# Patient Record
Sex: MALE | Race: OTHER | NOT HISPANIC OR LATINO | URBAN - METROPOLITAN AREA
[De-identification: names, ages, dates, MRNs, and addresses within clinical notes are randomized per-mention and may not be internally consistent; named-entity substitution may affect disease eponyms.]

---

## 2017-05-21 ENCOUNTER — EMERGENCY (EMERGENCY)
Facility: HOSPITAL | Age: 42
LOS: 1 days | Discharge: HOME | End: 2017-05-21
Attending: EMERGENCY MEDICINE | Admitting: EMERGENCY MEDICINE
Payer: COMMERCIAL

## 2017-05-21 ENCOUNTER — RESULT REVIEW (OUTPATIENT)
Age: 42
End: 2017-05-21

## 2017-05-21 VITALS
DIASTOLIC BLOOD PRESSURE: 66 MMHG | HEART RATE: 72 BPM | TEMPERATURE: 98 F | SYSTOLIC BLOOD PRESSURE: 109 MMHG | RESPIRATION RATE: 18 BRPM | OXYGEN SATURATION: 97 %

## 2017-05-21 DIAGNOSIS — R21 RASH AND OTHER NONSPECIFIC SKIN ERUPTION: ICD-10-CM

## 2017-05-21 DIAGNOSIS — Z79.899 OTHER LONG TERM (CURRENT) DRUG THERAPY: ICD-10-CM

## 2017-05-21 DIAGNOSIS — L23.9 ALLERGIC CONTACT DERMATITIS, UNSPECIFIED CAUSE: ICD-10-CM

## 2017-05-21 PROCEDURE — 99283 EMERGENCY DEPT VISIT LOW MDM: CPT | Mod: 25

## 2017-05-21 PROCEDURE — 99282 EMERGENCY DEPT VISIT SF MDM: CPT

## 2017-05-21 PROCEDURE — 99283 EMERGENCY DEPT VISIT LOW MDM: CPT

## 2017-05-21 PROCEDURE — 11100 BX SKIN SUBCUTANEOUS&/MUCOUS MEMBRANE 1 LESION: CPT | Mod: 59

## 2017-05-21 PROCEDURE — 88305 TISSUE EXAM BY PATHOLOGIST: CPT | Mod: 26

## 2017-05-21 PROCEDURE — 88305 TISSUE EXAM BY PATHOLOGIST: CPT

## 2017-05-21 RX ORDER — DIPHENHYDRAMINE HCL 50 MG
50 CAPSULE ORAL ONCE
Qty: 0 | Refills: 0 | Status: COMPLETED | OUTPATIENT
Start: 2017-05-21 | End: 2017-05-21

## 2017-05-21 NOTE — ED ADULT NURSE NOTE - NS ED NURSE DC INFO COMPLEXITY
Complex: Multiple Rx/Tx. Pt has difficulty understanding. Requires additional help/Simple: Patient demonstrates quick and easy understanding/Verbalized Understanding

## 2017-05-21 NOTE — ED PROVIDER NOTE - OBJECTIVE STATEMENT
41 M no significant PMHx p/w rash on face and neck for x 2days. First noted chalo 2 days ago starting as a bump on the L side of face when he was shaving. Rash progressively got worse and then started itching, no pain.  rash started on the Went to urgent care yesterday, was given steroid but rash hasn't improved. Denies any new shaving cream, soaps, detergent, food, medications. 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 No fever, chills, n/v/d, constipation. Eyes itchy, and 'feels swollen". No visual changes, lacrimation, runny nose, sneezing. +itch throat, denies soreness or difficulty swallowing 41 M no significant PMHx p/w rash on face and neck for x 2days. First noted chalo 2 days ago starting as a bump on the L side of face when he was shaving. Rash progressively got worse and then started itching, no pain.  rash started on the face and progressed to the neck and this AM was all swollen. Went to urgent care yesterday, was given steroid but rash hasn't improved. Denies any new shaving cream, soaps, detergent, food, medications. 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 No fever, chills, n/v/d, constipation. Eyes itchy, and 'feels swollen". No visual changes, lacrimation, runny nose, sneezing. +itch throat, denies soreness or difficulty swallowing.

## 2017-05-21 NOTE — ED PROVIDER NOTE - ATTENDING CONTRIBUTION TO CARE
Private Physician Martinez Francisco  41y male pmh neg, No habits, Employed as , Only allergy seasonal. Pt comes to ed complains of puritic rash face and neck past 3 days. No new meds, soaps.perfumes,detergents,foods,pets,plants, insecticides. No contacts with similar illness. Seen by urgent care with steroids yesterday improved and then worsened yesterday evening, PE WDWN nad normocephalic atraumatic chest clear anterior & posterior abd soft +bs neuro no focal defects. Face raised red papular rash to face and neck,. some with slight crusting.  Harrison Jones MD, Facep

## 2017-05-21 NOTE — ED ADULT NURSE NOTE - OBJECTIVE STATEMENT
40 y/o male presents to the ED c/o redness and swelling to face x 2 days. Pt states he noticed a small bite on forehead after working outside, increasingly worsened with multiple areas of erythema and generalized swelling. Seen by urgent care, given steroids and benadryl- states no relief. Denies SOB, difficulty breathing, n/v. Denies any new medications, food, lungs, allergies. Lungs CTA b/l, no swelling in throat, saturating at 100% on RA. Pt A&Ox3, in no respiratory distress, no CP, resting comfortably with safety maintained. 42 y/o male presents to the ED c/o redness and swelling to face x 2 days. Pt states he noticed a small bite on forehead after working outside, increasingly worsened with multiple areas of erythema and generalized swelling, slight pruituis. Seen by urgent care, given steroids and benadryl- states no relief. Denies SOB, difficulty breathing, n/v. Denies any new medications, food, lungs, allergies. Lungs CTA b/l, no swelling in throat, saturating at 100% on RA. Pt A&Ox3, in no respiratory distress, no CP, resting comfortably with safety maintained.

## 2017-05-21 NOTE — ED PROVIDER NOTE - PROGRESS NOTE DETAILS
Derm c/s Discussed with Derm req dc on Prednisone 40 daily  and will follow up as opt.  Harrison Jones MD, Facep

## 2017-05-21 NOTE — ED ADULT NURSE NOTE - CHPI ED SYMPTOMS NEG
no cough/no nausea/no vomiting/no shortness of breath/no wheezing/no difficulty swallowing/no difficulty breathing/no throat itching

## 2017-05-21 NOTE — ED PROVIDER NOTE - CARE PLAN
Principal Discharge DX:	Contact allergic reaction  Instructions for follow-up, activity and diet:	Follow up with Dr Palomo

## 2017-05-22 PROBLEM — Z00.00 ENCOUNTER FOR PREVENTIVE HEALTH EXAMINATION: Status: ACTIVE | Noted: 2017-05-22

## 2017-05-22 RX ORDER — CEPHALEXIN 500 MG
1 CAPSULE ORAL
Qty: 0 | Refills: 0 | COMMUNITY
Start: 2017-05-22

## 2017-05-22 RX ORDER — VALACYCLOVIR 500 MG/1
1 TABLET, FILM COATED ORAL
Qty: 0 | Refills: 0 | COMMUNITY
Start: 2017-05-22

## 2017-05-23 ENCOUNTER — INPATIENT (INPATIENT)
Facility: HOSPITAL | Age: 42
LOS: 4 days | Discharge: HOME | DRG: 607 | End: 2017-05-28
Attending: INTERNAL MEDICINE | Admitting: INTERNAL MEDICINE
Payer: COMMERCIAL

## 2017-05-23 VITALS
OXYGEN SATURATION: 99 % | SYSTOLIC BLOOD PRESSURE: 108 MMHG | RESPIRATION RATE: 18 BRPM | HEART RATE: 88 BPM | TEMPERATURE: 98 F | DIASTOLIC BLOOD PRESSURE: 70 MMHG

## 2017-05-23 DIAGNOSIS — T78.40XA ALLERGY, UNSPECIFIED, INITIAL ENCOUNTER: ICD-10-CM

## 2017-05-23 LAB
ALBUMIN SERPL ELPH-MCNC: 4.2 G/DL — SIGNIFICANT CHANGE UP (ref 3.3–5)
ALP SERPL-CCNC: 123 U/L — HIGH (ref 40–120)
ALT FLD-CCNC: 24 U/L RC — SIGNIFICANT CHANGE UP (ref 10–45)
ANION GAP SERPL CALC-SCNC: 13 MMOL/L — SIGNIFICANT CHANGE UP (ref 5–17)
APTT BLD: 32.1 SEC — SIGNIFICANT CHANGE UP (ref 27.5–37.4)
AST SERPL-CCNC: 13 U/L — SIGNIFICANT CHANGE UP (ref 10–40)
BASOPHILS # BLD AUTO: 0 K/UL — SIGNIFICANT CHANGE UP (ref 0–0.2)
BASOPHILS NFR BLD AUTO: 0.3 % — SIGNIFICANT CHANGE UP (ref 0–2)
BILIRUB SERPL-MCNC: 1.1 MG/DL — SIGNIFICANT CHANGE UP (ref 0.2–1.2)
BUN SERPL-MCNC: 13 MG/DL — SIGNIFICANT CHANGE UP (ref 7–23)
CALCIUM SERPL-MCNC: 8.9 MG/DL — SIGNIFICANT CHANGE UP (ref 8.4–10.5)
CHLORIDE SERPL-SCNC: 100 MMOL/L — SIGNIFICANT CHANGE UP (ref 96–108)
CO2 SERPL-SCNC: 26 MMOL/L — SIGNIFICANT CHANGE UP (ref 22–31)
CREAT SERPL-MCNC: 0.87 MG/DL — SIGNIFICANT CHANGE UP (ref 0.5–1.3)
EOSINOPHIL # BLD AUTO: 0.4 K/UL — SIGNIFICANT CHANGE UP (ref 0–0.5)
EOSINOPHIL NFR BLD AUTO: 3.1 % — SIGNIFICANT CHANGE UP (ref 0–6)
GLUCOSE SERPL-MCNC: 93 MG/DL — SIGNIFICANT CHANGE UP (ref 70–99)
HCT VFR BLD CALC: 52.5 % — HIGH (ref 39–50)
HGB BLD-MCNC: 18.1 G/DL — HIGH (ref 13–17)
HSV+VZV DNA SPEC QL NAA+PROBE: SIGNIFICANT CHANGE UP
INR BLD: 1.09 RATIO — SIGNIFICANT CHANGE UP (ref 0.88–1.16)
LYMPHOCYTES # BLD AUTO: 0.9 K/UL — LOW (ref 1–3.3)
LYMPHOCYTES # BLD AUTO: 6.7 % — LOW (ref 13–44)
MCHC RBC-ENTMCNC: 33.4 PG — SIGNIFICANT CHANGE UP (ref 27–34)
MCHC RBC-ENTMCNC: 34.5 GM/DL — SIGNIFICANT CHANGE UP (ref 32–36)
MCV RBC AUTO: 97 FL — SIGNIFICANT CHANGE UP (ref 80–100)
MONOCYTES # BLD AUTO: 1.2 K/UL — HIGH (ref 0–0.9)
MONOCYTES NFR BLD AUTO: 9.2 % — SIGNIFICANT CHANGE UP (ref 2–14)
NEUTROPHILS # BLD AUTO: 10.5 K/UL — HIGH (ref 1.8–7.4)
NEUTROPHILS NFR BLD AUTO: 80.7 % — HIGH (ref 43–77)
PLATELET # BLD AUTO: 221 K/UL — SIGNIFICANT CHANGE UP (ref 150–400)
POTASSIUM SERPL-MCNC: 4 MMOL/L — SIGNIFICANT CHANGE UP (ref 3.5–5.3)
POTASSIUM SERPL-SCNC: 4 MMOL/L — SIGNIFICANT CHANGE UP (ref 3.5–5.3)
PROT SERPL-MCNC: 6.9 G/DL — SIGNIFICANT CHANGE UP (ref 6–8.3)
PROTHROM AB SERPL-ACNC: 11.8 SEC — SIGNIFICANT CHANGE UP (ref 9.8–12.7)
RBC # BLD: 5.41 M/UL — SIGNIFICANT CHANGE UP (ref 4.2–5.8)
RBC # FLD: 11.4 % — SIGNIFICANT CHANGE UP (ref 10.3–14.5)
SODIUM SERPL-SCNC: 139 MMOL/L — SIGNIFICANT CHANGE UP (ref 135–145)
SPECIMEN SOURCE: SIGNIFICANT CHANGE UP
WBC # BLD: 13 K/UL — HIGH (ref 3.8–10.5)
WBC # FLD AUTO: 13 K/UL — HIGH (ref 3.8–10.5)

## 2017-05-23 PROCEDURE — 99285 EMERGENCY DEPT VISIT HI MDM: CPT

## 2017-05-23 RX ORDER — SODIUM CHLORIDE 9 MG/ML
1000 INJECTION INTRAMUSCULAR; INTRAVENOUS; SUBCUTANEOUS
Qty: 0 | Refills: 0 | Status: DISCONTINUED | OUTPATIENT
Start: 2017-05-23 | End: 2017-05-26

## 2017-05-23 RX ORDER — ACETAMINOPHEN 500 MG
650 TABLET ORAL EVERY 6 HOURS
Qty: 0 | Refills: 0 | Status: DISCONTINUED | OUTPATIENT
Start: 2017-05-23 | End: 2017-05-28

## 2017-05-23 RX ORDER — HEPARIN SODIUM 5000 [USP'U]/ML
5000 INJECTION INTRAVENOUS; SUBCUTANEOUS EVERY 12 HOURS
Qty: 0 | Refills: 0 | Status: DISCONTINUED | OUTPATIENT
Start: 2017-05-23 | End: 2017-05-28

## 2017-05-23 RX ORDER — CEFAZOLIN SODIUM 1 G
2000 VIAL (EA) INJECTION EVERY 8 HOURS
Qty: 0 | Refills: 0 | Status: DISCONTINUED | OUTPATIENT
Start: 2017-05-23 | End: 2017-05-26

## 2017-05-23 RX ORDER — DEXAMETHASONE 0.5 MG/5ML
7 ELIXIR ORAL ONCE
Qty: 0 | Refills: 0 | Status: COMPLETED | OUTPATIENT
Start: 2017-05-23 | End: 2017-05-23

## 2017-05-23 RX ADMIN — Medication 101.4 MILLIGRAM(S): at 10:31

## 2017-05-23 RX ADMIN — Medication 100 MILLIGRAM(S): at 22:39

## 2017-05-23 RX ADMIN — Medication 1 APPLICATION(S): at 19:14

## 2017-05-23 RX ADMIN — SODIUM CHLORIDE 100 MILLILITER(S): 9 INJECTION INTRAMUSCULAR; INTRAVENOUS; SUBCUTANEOUS at 20:00

## 2017-05-23 RX ADMIN — Medication 100 MILLIGRAM(S): at 13:46

## 2017-05-23 RX ADMIN — HEPARIN SODIUM 5000 UNIT(S): 5000 INJECTION INTRAVENOUS; SUBCUTANEOUS at 19:13

## 2017-05-23 NOTE — H&P ADULT. - ASSESSMENT
pt is a 41 yr old w/ marked facial swelling and lesions likely imprtigo  iv abs as per id  r/o zoster  f/u cultures  iv fluids  dvt proph  discussed w/ pt / wife/ dr cheung

## 2017-05-23 NOTE — ED PROVIDER NOTE - ATTENDING CONTRIBUTION TO CARE
Patient seen and evaluated with resident/NP/PA, however HPI, ROS, PE and MDM as documented authored by myself - Wallace Leigh MD

## 2017-05-23 NOTE — ED PROVIDER NOTE - MEDICAL DECISION MAKING DETAILS
Patient with significant facial edema from unclear cause - worsening of symptoms possibly related to now off steroids x24 hours - received 3mg Decadron prior to arrival per family, will increase dose to 7mg, check basic labs, re-consult dermatology in ED. No evidence of airway compromise at this time, however given degree of facial swelling will require admission for IVF and airway monitoring

## 2017-05-23 NOTE — ED PROVIDER NOTE - PHYSICAL EXAMINATION
HEENT/Skin:  Significant generalized facial swelling, unable to open eyes due to degree of edema of eyelids, significant swelling of lips.  Intraoral exam limited however no evidence of tongue swelling/uvular edema, normal phonation.  Skin on face with multiple lesions in various stages - some with generalized breakdown, some erythematous, some possible honey crusting of lesions versus dried exudative fluid (unclear)

## 2017-05-23 NOTE — H&P ADULT. - HISTORY OF PRESENT ILLNESS
Patient presenting with facial swelling.  Startedabout  4 days ago, started on steroids - worsened.  Came to ED - seen by Derm Dr. Palomo - bedside biopsy (results unknown) and discharged with steroids  Never filled steroids, saw PMD yesterday, started on acyclovir/keflex - this AM awoke with significant facial swelling.  Reporting difficulty opening mouth due to swelling, unable to open eyes.  Denying swelling in mouth/throat,/ no prior allergic reactions.

## 2017-05-23 NOTE — ED PROVIDER NOTE - OBJECTIVE STATEMENT
Patient presenting with facial swelling.  Started 4 days ago, started on steroids - worsened.  Came to ED - seen by Derm Dr. Palomo - bedside biopsy (results unknown) and discharged with higher steroid burst.  Never filled steroids, saw PMD yesterday, started on acyclovir/keflex (yesterday) - this AM awoke with significant facial swelling.  Reporting difficulty opening mouth due to swelling, unable to open eyes.  Denying swelling in mouth/throat, can swallow secretions, but cannot eat today as having difficulty moving jaw due to facial swelling.  Initial trigger unknown, no prior allergic reactions.

## 2017-05-24 ENCOUNTER — TRANSCRIPTION ENCOUNTER (OUTPATIENT)
Age: 42
End: 2017-05-24

## 2017-05-24 LAB
HCT VFR BLD CALC: 48.5 % — SIGNIFICANT CHANGE UP (ref 39–50)
HGB BLD-MCNC: 16.5 G/DL — SIGNIFICANT CHANGE UP (ref 13–17)
MCHC RBC-ENTMCNC: 31.5 PG — SIGNIFICANT CHANGE UP (ref 27–34)
MCHC RBC-ENTMCNC: 34 GM/DL — SIGNIFICANT CHANGE UP (ref 32–36)
MCV RBC AUTO: 92.7 FL — SIGNIFICANT CHANGE UP (ref 80–100)
PLATELET # BLD AUTO: 232 K/UL — SIGNIFICANT CHANGE UP (ref 150–400)
RBC # BLD: 5.23 M/UL — SIGNIFICANT CHANGE UP (ref 4.2–5.8)
RBC # FLD: 12.6 % — SIGNIFICANT CHANGE UP (ref 10.3–14.5)
WBC # BLD: 19.64 K/UL — HIGH (ref 3.8–10.5)
WBC # FLD AUTO: 19.64 K/UL — HIGH (ref 3.8–10.5)

## 2017-05-24 PROCEDURE — 70450 CT HEAD/BRAIN W/O DYE: CPT | Mod: 26

## 2017-05-24 PROCEDURE — 99223 1ST HOSP IP/OBS HIGH 75: CPT | Mod: GC

## 2017-05-24 RX ORDER — SODIUM CHLORIDE 9 MG/ML
500 INJECTION INTRAMUSCULAR; INTRAVENOUS; SUBCUTANEOUS ONCE
Qty: 0 | Refills: 0 | Status: COMPLETED | OUTPATIENT
Start: 2017-05-24 | End: 2017-05-24

## 2017-05-24 RX ORDER — DEXAMETHASONE 0.5 MG/5ML
10 ELIXIR ORAL ONCE
Qty: 0 | Refills: 0 | Status: COMPLETED | OUTPATIENT
Start: 2017-05-24 | End: 2017-05-24

## 2017-05-24 RX ORDER — DIPHENHYDRAMINE HCL 50 MG
50 CAPSULE ORAL ONCE
Qty: 0 | Refills: 0 | Status: COMPLETED | OUTPATIENT
Start: 2017-05-24 | End: 2017-05-24

## 2017-05-24 RX ORDER — MUPIROCIN 20 MG/G
1 OINTMENT TOPICAL
Qty: 0 | Refills: 0 | Status: DISCONTINUED | OUTPATIENT
Start: 2017-05-24 | End: 2017-05-28

## 2017-05-24 RX ORDER — FAMOTIDINE 10 MG/ML
20 INJECTION INTRAVENOUS ONCE
Qty: 0 | Refills: 0 | Status: COMPLETED | OUTPATIENT
Start: 2017-05-24 | End: 2017-05-24

## 2017-05-24 RX ADMIN — HEPARIN SODIUM 5000 UNIT(S): 5000 INJECTION INTRAVENOUS; SUBCUTANEOUS at 05:06

## 2017-05-24 RX ADMIN — Medication 1 APPLICATION(S): at 05:06

## 2017-05-24 RX ADMIN — HEPARIN SODIUM 5000 UNIT(S): 5000 INJECTION INTRAVENOUS; SUBCUTANEOUS at 17:54

## 2017-05-24 RX ADMIN — FAMOTIDINE 20 MILLIGRAM(S): 10 INJECTION INTRAVENOUS at 00:53

## 2017-05-24 RX ADMIN — Medication 50 MILLIGRAM(S): at 18:49

## 2017-05-24 RX ADMIN — SODIUM CHLORIDE 100 MILLILITER(S): 9 INJECTION INTRAMUSCULAR; INTRAVENOUS; SUBCUTANEOUS at 14:26

## 2017-05-24 RX ADMIN — Medication 1 APPLICATION(S): at 17:53

## 2017-05-24 RX ADMIN — Medication 50 MILLIGRAM(S): at 00:53

## 2017-05-24 RX ADMIN — SODIUM CHLORIDE 500 MILLILITER(S): 9 INJECTION INTRAMUSCULAR; INTRAVENOUS; SUBCUTANEOUS at 00:53

## 2017-05-24 RX ADMIN — SODIUM CHLORIDE 100 MILLILITER(S): 9 INJECTION INTRAMUSCULAR; INTRAVENOUS; SUBCUTANEOUS at 21:48

## 2017-05-24 RX ADMIN — Medication 100 MILLIGRAM(S): at 21:48

## 2017-05-24 RX ADMIN — Medication 100 MILLIGRAM(S): at 14:27

## 2017-05-24 RX ADMIN — MUPIROCIN 1 APPLICATION(S): 20 OINTMENT TOPICAL at 21:48

## 2017-05-24 RX ADMIN — Medication 65 MILLIGRAM(S): at 17:54

## 2017-05-24 RX ADMIN — Medication 102 MILLIGRAM(S): at 01:13

## 2017-05-24 RX ADMIN — Medication 100 MILLIGRAM(S): at 05:06

## 2017-05-24 NOTE — DISCHARGE NOTE ADULT - MEDICATION SUMMARY - MEDICATIONS TO TAKE
I will START or STAY ON the medications listed below when I get home from the hospital:    predniSONE 10 mg oral tablet  -- 3 tab(s) by mouth once a day  -- Indication: For Facial swelling    diphenhydrAMINE 25 mg oral capsule  -- 1 cap(s) by mouth every 6 hours, As needed, Rash and/or Itching  -- Indication: For Rash/Itching    cetirizine 10 mg oral tablet  -- 1 tab(s) by mouth once a day  -- Indication: For Facial swelling    mupirocin 2% topical ointment  -- 1 application on skin 2 times a day  -- Indication: For Rash    clobetasol 0.05% topical ointment  -- 1 application on skin 2 times a day  -- Indication: For Rash    ocular lubricant ophthalmic solution  -- 1 drop(s) to each affected eye 4 times a day, As needed, Dry Eyes  -- Indication: For Dry eyes    amoxicillin-clavulanate 875 mg-125 mg oral tablet  -- 1 tab(s) by mouth 2 times a day  -- Indication: For Facial swelling

## 2017-05-24 NOTE — DISCHARGE NOTE ADULT - PATIENT PORTAL LINK FT
“You can access the Follow[facility] Patient Portal, offered by [facility], by registering with the following website: [email or web address]”

## 2017-05-24 NOTE — DISCHARGE NOTE ADULT - CARE PROVIDER_API CALL
Fernie Palomo), Dermatology  [address] [place] [address]  Phone: [phone]  Fax: [phone]    Vladimir Garrett (MD), Infectious Disease; Internal Medicine  [address] [place] [address]  Phone: [phone]  Fax: [phone] Fernie Palomo), Dermatology  [address] [place] [address]  Phone: [phone]  Fax: [phone]    Vladimir Garrett), Infectious Disease; Internal Medicine  [address] [place] [address]  Phone: [phone]  Fax: [phone]    Mari Mahoney), Pediatrics AllergyImmunology  [address] [place] [address]  Phone: [phone]  Fax: [phone]

## 2017-05-24 NOTE — DISCHARGE NOTE ADULT - CARE PROVIDERS DIRECT ADDRESSES
,[email or web address],DirectAddress_Unknown

## 2017-05-24 NOTE — DISCHARGE NOTE ADULT - PLAN OF CARE
likely impetigo Continue with medications as prescribed by your physician.  Follow-up with your primary care physician in 1 week. You will need to follow up with the dermatologist, Dr. Palomo [phone], on Tuesday, May 30 - you must call to make an appointment.  At this time, you will discuss the tapering of your prednisone dose.  Continue with medications as prescribed by your physician.  Follow-up with your primary care physician in 1 week.    If your rash/facial swelling does not continue to improve, you should follow up with the infectious disease doctor, Dr. Garrett [phone]. You will need to follow up with the dermatologist, Dr. Palomo [phone], on Tuesday, May 30 - you must call to make an appointment.  At this time, you will discuss the tapering of your prednisone dose.    You will need to follow up with the allergist/immunologist [phone] within one week of discharge - please call to make an appointment.  Continue with medications as prescribed by your physician.  Follow-up with your primary care physician in 1 week.    If your rash/facial swelling does not continue to improve, you should follow up with the infectious disease doctor, Dr. Garrett [phone]. You are being discharged on antibiotics (Augmentin) which you will take for a total of 5 days - the course will be completed after the am dose on June 2, 2017.    You will need to follow up with the dermatologist, Dr. Palomo [phone], on Tuesday, May 30 - you must call to make an appointment.  At this time, you will discuss the tapering of your prednisone dose.    You will need to follow up with the allergist/immunologist [phone] within one week of discharge - please call to make an appointment.  Continue with medications as prescribed by your physician.  Follow-up with your primary care physician in 1 week.    If your rash/facial swelling does not continue to improve, you should follow up with the infectious disease doctor, Dr. Garrett [phone].

## 2017-05-24 NOTE — DISCHARGE NOTE ADULT - CARE PLAN
Principal Discharge DX:	Facial swelling  Goal:	likely impetigo  Instructions for follow-up, activity and diet:	Continue with medications as prescribed by your physician.  Follow-up with your primary care physician in 1 week. Principal Discharge DX:	Facial swelling  Goal:	likely impetigo  Instructions for follow-up, activity and diet:	You will need to follow up with the dermatologist, Dr. Palomo [phone], on Tuesday, May 30 - you must call to make an appointment.  At this time, you will discuss the tapering of your prednisone dose.  Continue with medications as prescribed by your physician.  Follow-up with your primary care physician in 1 week.    If your rash/facial swelling does not continue to improve, you should follow up with the infectious disease doctor, Dr. Garrett [phone]. Principal Discharge DX:	Facial swelling  Goal:	likely impetigo  Instructions for follow-up, activity and diet:	You will need to follow up with the dermatologist, Dr. Palomo [phone], on Tuesday, May 30 - you must call to make an appointment.  At this time, you will discuss the tapering of your prednisone dose.    You will need to follow up with the allergist/immunologist [phone] within one week of discharge - please call to make an appointment.  Continue with medications as prescribed by your physician.  Follow-up with your primary care physician in 1 week.    If your rash/facial swelling does not continue to improve, you should follow up with the infectious disease doctor, Dr. Garrett [phone]. Principal Discharge DX:	Facial swelling  Goal:	likely impetigo  Instructions for follow-up, activity and diet:	You are being discharged on antibiotics (Augmentin) which you will take for a total of 5 days - the course will be completed after the am dose on June 2, 2017.    You will need to follow up with the dermatologist, Dr. Palomo [phone], on Tuesday, May 30 - you must call to make an appointment.  At this time, you will discuss the tapering of your prednisone dose.    You will need to follow up with the allergist/immunologist [phone] within one week of discharge - please call to make an appointment.  Continue with medications as prescribed by your physician.  Follow-up with your primary care physician in 1 week.    If your rash/facial swelling does not continue to improve, you should follow up with the infectious disease doctor, Dr. Garrett [phone]. Principal Discharge DX:	Facial swelling  Goal:	likely impetigo  Instructions for follow-up, activity and diet:	You are being discharged on antibiotics (Augmentin) which you will take for a total of 5 days - the course will be completed after the am dose on June 2, 2017.    You will need to follow up with the dermatologist, Dr. Palomo [phone], on Tuesday, May 30 - you must call to make an appointment.  At this time, you will discuss the tapering of your prednisone dose.    You will need to follow up with the allergist/immunologist [phone] within one week of discharge - please call to make an appointment.  Continue with medications as prescribed by your physician.  Follow-up with your primary care physician in 1 week.    If your rash/facial swelling does not continue to improve, you should follow up with the infectious disease doctor, Dr. Garrett [phone]. Principal Discharge DX:	Facial swelling  Goal:	likely impetigo  Instructions for follow-up, activity and diet:	You are being discharged on antibiotics (Augmentin) which you will take for a total of 5 days - the course will be completed after the am dose on June 2, 2017.    You will need to follow up with the dermatologist, Dr. Palomo [phone], on Tuesday, May 30 - you must call to make an appointment.  At this time, you will discuss the tapering of your prednisone dose.    You will need to follow up with the allergist/immunologist [phone] within one week of discharge - please call to make an appointment.  Continue with medications as prescribed by your physician.  Follow-up with your primary care physician in 1 week.    If your rash/facial swelling does not continue to improve, you should follow up with the infectious disease doctor, Dr. Garrett [phone].

## 2017-05-24 NOTE — DISCHARGE NOTE ADULT - PROVIDER TOKENS
TOKEN:'[id number]',TOKEN:'[id number]' TOKEN:'[id number]',[id number]',TOKEN:'[id number]'

## 2017-05-24 NOTE — DISCHARGE NOTE ADULT - HOSPITAL COURSE
To be completed by attending. 41 yrs old male facial swelling.  Started 4 days ago, started on steroids - worsened.  Came to ED - seen by Derm Dr. Palomo - bedside biopsy (results unknown) and discharged with higher steroid burst.  Never filled steroids, saw PMD yesterday, started on acyclovir/keflex (yesterday) - this AM awoke with significant facial swelling.  Reporting difficulty opening mouth due to swelling, unable to open eyes.  Denying swelling in mouth/throat, can swallow secretions, but cannot eat today  pt seen by id  / derm / allergy / txed initially w/ abs / biopsy w/ severe contact dermatitis  tx w/ creams as per derm   steroids  pt w/ slow improvement and d/c w/ close outpt f/u

## 2017-05-24 NOTE — DISCHARGE NOTE ADULT - ADDITIONAL INSTRUCTIONS
Follow-up with your primary care physician in 1 week. You will need to follow up with the dermatologist, Dr. Palomo [phone], on Tuesday, May 30 - you must call to make an appointment.  At this time, you will discuss the tapering of your prednisone dose.    If your rash/facial swelling does not continue to improve, you should follow up with the infectious disease doctor, Dr. Garrett [phone].    Follow-up with your primary care physician in 1 week. You will need to follow up with the dermatologist, Dr. Palomo [phone], on Tuesday, May 30 - you must call to make an appointment.  At this time, you will discuss the tapering of your prednisone dose.    You will need to follow up with the allergist/immunologist [phone] within one week of discharge - please call to make an appointment.    If your rash/facial swelling does not continue to improve, you should follow up with the infectious disease doctor, Dr. Garrett [phone].    Follow-up with your primary care physician in 1 week. You are being discharged on antibiotics (Augmentin) which you will take for a total of 5 days - the course will be completed after the am dose on June 2, 2017.    You will need to follow up with the dermatologist, Dr. Palomo [phone], on Tuesday, May 30 - you must call to make an appointment.  At this time, you will discuss the tapering of your prednisone dose.    You will need to follow up with the allergist/immunologist [phone] within one week of discharge - please call to make an appointment.    If your rash/facial swelling does not continue to improve, you should follow up with the infectious disease doctor, Dr. Garrett [phone].    Follow-up with your primary care physician in 1 week.

## 2017-05-25 LAB
CULTURE RESULTS: SIGNIFICANT CHANGE UP
HCT VFR BLD CALC: 46.4 % — SIGNIFICANT CHANGE UP (ref 39–50)
HGB BLD-MCNC: 15.3 G/DL — SIGNIFICANT CHANGE UP (ref 13–17)
MCHC RBC-ENTMCNC: 31.2 PG — SIGNIFICANT CHANGE UP (ref 27–34)
MCHC RBC-ENTMCNC: 33 GM/DL — SIGNIFICANT CHANGE UP (ref 32–36)
MCV RBC AUTO: 94.7 FL — SIGNIFICANT CHANGE UP (ref 80–100)
PLATELET # BLD AUTO: 223 K/UL — SIGNIFICANT CHANGE UP (ref 150–400)
RBC # BLD: 4.9 M/UL — SIGNIFICANT CHANGE UP (ref 4.2–5.8)
RBC # FLD: 12.7 % — SIGNIFICANT CHANGE UP (ref 10.3–14.5)
SPECIMEN SOURCE: SIGNIFICANT CHANGE UP
WBC # BLD: 22.83 K/UL — HIGH (ref 3.8–10.5)
WBC # FLD AUTO: 22.83 K/UL — HIGH (ref 3.8–10.5)

## 2017-05-25 PROCEDURE — 99233 SBSQ HOSP IP/OBS HIGH 50: CPT | Mod: GC

## 2017-05-25 PROCEDURE — 99232 SBSQ HOSP IP/OBS MODERATE 35: CPT

## 2017-05-25 RX ORDER — DIPHENHYDRAMINE HCL 50 MG
25 CAPSULE ORAL AT BEDTIME
Qty: 0 | Refills: 0 | Status: DISCONTINUED | OUTPATIENT
Start: 2017-05-25 | End: 2017-05-28

## 2017-05-25 RX ORDER — LORATADINE 10 MG/1
10 TABLET ORAL ONCE
Qty: 0 | Refills: 0 | Status: COMPLETED | OUTPATIENT
Start: 2017-05-25 | End: 2017-05-25

## 2017-05-25 RX ORDER — DIPHENHYDRAMINE HCL 50 MG
50 CAPSULE ORAL EVERY 4 HOURS
Qty: 0 | Refills: 0 | Status: DISCONTINUED | OUTPATIENT
Start: 2017-05-25 | End: 2017-05-25

## 2017-05-25 RX ORDER — LORATADINE 10 MG/1
10 TABLET ORAL
Qty: 0 | Refills: 0 | Status: DISCONTINUED | OUTPATIENT
Start: 2017-05-25 | End: 2017-05-25

## 2017-05-25 RX ORDER — DIPHENHYDRAMINE HCL 50 MG
25 CAPSULE ORAL EVERY 6 HOURS
Qty: 0 | Refills: 0 | Status: DISCONTINUED | OUTPATIENT
Start: 2017-05-25 | End: 2017-05-28

## 2017-05-25 RX ADMIN — Medication 25 MILLIGRAM(S): at 10:33

## 2017-05-25 RX ADMIN — Medication 100 MILLIGRAM(S): at 21:26

## 2017-05-25 RX ADMIN — SODIUM CHLORIDE 100 MILLILITER(S): 9 INJECTION INTRAMUSCULAR; INTRAVENOUS; SUBCUTANEOUS at 10:32

## 2017-05-25 RX ADMIN — Medication 100 MILLIGRAM(S): at 05:20

## 2017-05-25 RX ADMIN — MUPIROCIN 1 APPLICATION(S): 20 OINTMENT TOPICAL at 05:20

## 2017-05-25 RX ADMIN — HEPARIN SODIUM 5000 UNIT(S): 5000 INJECTION INTRAVENOUS; SUBCUTANEOUS at 17:56

## 2017-05-25 RX ADMIN — Medication 1 DROP(S): at 17:56

## 2017-05-25 RX ADMIN — Medication 1 APPLICATION(S): at 07:33

## 2017-05-25 RX ADMIN — LORATADINE 10 MILLIGRAM(S): 10 TABLET ORAL at 23:38

## 2017-05-25 RX ADMIN — Medication 50 MILLIGRAM(S): at 02:32

## 2017-05-25 RX ADMIN — Medication 25 MILLIGRAM(S): at 21:27

## 2017-05-25 RX ADMIN — Medication 100 MILLIGRAM(S): at 14:08

## 2017-05-25 RX ADMIN — HEPARIN SODIUM 5000 UNIT(S): 5000 INJECTION INTRAVENOUS; SUBCUTANEOUS at 05:19

## 2017-05-25 RX ADMIN — MUPIROCIN 1 APPLICATION(S): 20 OINTMENT TOPICAL at 17:56

## 2017-05-25 RX ADMIN — SODIUM CHLORIDE 100 MILLILITER(S): 9 INJECTION INTRAMUSCULAR; INTRAVENOUS; SUBCUTANEOUS at 21:26

## 2017-05-26 LAB
HCT VFR BLD CALC: 48.8 % — SIGNIFICANT CHANGE UP (ref 39–50)
HGB BLD-MCNC: 16.7 G/DL — SIGNIFICANT CHANGE UP (ref 13–17)
MCHC RBC-ENTMCNC: 32.4 PG — SIGNIFICANT CHANGE UP (ref 27–34)
MCHC RBC-ENTMCNC: 34.2 GM/DL — SIGNIFICANT CHANGE UP (ref 32–36)
MCV RBC AUTO: 94.8 FL — SIGNIFICANT CHANGE UP (ref 80–100)
PLATELET # BLD AUTO: 219 K/UL — SIGNIFICANT CHANGE UP (ref 150–400)
RBC # BLD: 5.15 M/UL — SIGNIFICANT CHANGE UP (ref 4.2–5.8)
RBC # FLD: 12.9 % — SIGNIFICANT CHANGE UP (ref 10.3–14.5)
WBC # BLD: 14.52 K/UL — HIGH (ref 3.8–10.5)
WBC # FLD AUTO: 14.52 K/UL — HIGH (ref 3.8–10.5)

## 2017-05-26 PROCEDURE — 99233 SBSQ HOSP IP/OBS HIGH 50: CPT | Mod: GC

## 2017-05-26 PROCEDURE — 99232 SBSQ HOSP IP/OBS MODERATE 35: CPT

## 2017-05-26 RX ORDER — LORATADINE 10 MG/1
10 TABLET ORAL DAILY
Qty: 0 | Refills: 0 | Status: DISCONTINUED | OUTPATIENT
Start: 2017-05-26 | End: 2017-05-28

## 2017-05-26 RX ADMIN — SODIUM CHLORIDE 100 MILLILITER(S): 9 INJECTION INTRAMUSCULAR; INTRAVENOUS; SUBCUTANEOUS at 08:08

## 2017-05-26 RX ADMIN — Medication 25 MILLIGRAM(S): at 21:25

## 2017-05-26 RX ADMIN — Medication 1 APPLICATION(S): at 21:25

## 2017-05-26 RX ADMIN — HEPARIN SODIUM 5000 UNIT(S): 5000 INJECTION INTRAVENOUS; SUBCUTANEOUS at 05:13

## 2017-05-26 RX ADMIN — HEPARIN SODIUM 5000 UNIT(S): 5000 INJECTION INTRAVENOUS; SUBCUTANEOUS at 17:15

## 2017-05-26 RX ADMIN — Medication 650 MILLIGRAM(S): at 22:44

## 2017-05-26 RX ADMIN — Medication 100 MILLIGRAM(S): at 05:13

## 2017-05-26 RX ADMIN — MUPIROCIN 1 APPLICATION(S): 20 OINTMENT TOPICAL at 17:14

## 2017-05-26 RX ADMIN — MUPIROCIN 1 APPLICATION(S): 20 OINTMENT TOPICAL at 05:12

## 2017-05-26 RX ADMIN — Medication 650 MILLIGRAM(S): at 22:14

## 2017-05-26 RX ADMIN — Medication 1 APPLICATION(S): at 08:07

## 2017-05-26 RX ADMIN — LORATADINE 10 MILLIGRAM(S): 10 TABLET ORAL at 13:36

## 2017-05-27 LAB
HCT VFR BLD CALC: 51.5 % — HIGH (ref 39–50)
HGB BLD-MCNC: 17.7 G/DL — HIGH (ref 13–17)
MCHC RBC-ENTMCNC: 32.7 PG — SIGNIFICANT CHANGE UP (ref 27–34)
MCHC RBC-ENTMCNC: 34.4 GM/DL — SIGNIFICANT CHANGE UP (ref 32–36)
MCV RBC AUTO: 95.2 FL — SIGNIFICANT CHANGE UP (ref 80–100)
PLATELET # BLD AUTO: 218 K/UL — SIGNIFICANT CHANGE UP (ref 150–400)
RBC # BLD: 5.41 M/UL — SIGNIFICANT CHANGE UP (ref 4.2–5.8)
RBC # FLD: 13 % — SIGNIFICANT CHANGE UP (ref 10.3–14.5)
WBC # BLD: 17.54 K/UL — HIGH (ref 3.8–10.5)
WBC # FLD AUTO: 17.54 K/UL — HIGH (ref 3.8–10.5)

## 2017-05-27 PROCEDURE — 99254 IP/OBS CNSLTJ NEW/EST MOD 60: CPT

## 2017-05-27 PROCEDURE — 99232 SBSQ HOSP IP/OBS MODERATE 35: CPT | Mod: GC

## 2017-05-27 RX ORDER — PIPERACILLIN AND TAZOBACTAM 4; .5 G/20ML; G/20ML
3.38 INJECTION, POWDER, LYOPHILIZED, FOR SOLUTION INTRAVENOUS EVERY 8 HOURS
Qty: 0 | Refills: 0 | Status: DISCONTINUED | OUTPATIENT
Start: 2017-05-27 | End: 2017-05-28

## 2017-05-27 RX ORDER — PIPERACILLIN AND TAZOBACTAM 4; .5 G/20ML; G/20ML
3.38 INJECTION, POWDER, LYOPHILIZED, FOR SOLUTION INTRAVENOUS ONCE
Qty: 0 | Refills: 0 | Status: COMPLETED | OUTPATIENT
Start: 2017-05-27 | End: 2017-05-27

## 2017-05-27 RX ORDER — VANCOMYCIN HCL 1 G
1250 VIAL (EA) INTRAVENOUS EVERY 12 HOURS
Qty: 0 | Refills: 0 | Status: DISCONTINUED | OUTPATIENT
Start: 2017-05-27 | End: 2017-05-28

## 2017-05-27 RX ADMIN — LORATADINE 10 MILLIGRAM(S): 10 TABLET ORAL at 12:05

## 2017-05-27 RX ADMIN — Medication 25 MILLIGRAM(S): at 08:35

## 2017-05-27 RX ADMIN — MUPIROCIN 1 APPLICATION(S): 20 OINTMENT TOPICAL at 05:03

## 2017-05-27 RX ADMIN — PIPERACILLIN AND TAZOBACTAM 200 GRAM(S): 4; .5 INJECTION, POWDER, LYOPHILIZED, FOR SOLUTION INTRAVENOUS at 18:28

## 2017-05-27 RX ADMIN — HEPARIN SODIUM 5000 UNIT(S): 5000 INJECTION INTRAVENOUS; SUBCUTANEOUS at 18:29

## 2017-05-27 RX ADMIN — PIPERACILLIN AND TAZOBACTAM 25 GRAM(S): 4; .5 INJECTION, POWDER, LYOPHILIZED, FOR SOLUTION INTRAVENOUS at 21:04

## 2017-05-27 RX ADMIN — Medication 166.67 MILLIGRAM(S): at 18:28

## 2017-05-27 RX ADMIN — Medication 25 MILLIGRAM(S): at 21:03

## 2017-05-27 RX ADMIN — HEPARIN SODIUM 5000 UNIT(S): 5000 INJECTION INTRAVENOUS; SUBCUTANEOUS at 05:03

## 2017-05-27 RX ADMIN — Medication 25 MILLIGRAM(S): at 14:30

## 2017-05-27 RX ADMIN — Medication 1 APPLICATION(S): at 19:47

## 2017-05-27 RX ADMIN — MUPIROCIN 1 APPLICATION(S): 20 OINTMENT TOPICAL at 18:31

## 2017-05-27 RX ADMIN — Medication 1 APPLICATION(S): at 08:35

## 2017-05-28 VITALS
RESPIRATION RATE: 18 BRPM | HEART RATE: 74 BPM | OXYGEN SATURATION: 97 % | SYSTOLIC BLOOD PRESSURE: 113 MMHG | TEMPERATURE: 98 F | DIASTOLIC BLOOD PRESSURE: 76 MMHG

## 2017-05-28 LAB
CULTURE RESULTS: SIGNIFICANT CHANGE UP
CULTURE RESULTS: SIGNIFICANT CHANGE UP
HCT VFR BLD CALC: 50.3 % — HIGH (ref 39–50)
HGB BLD-MCNC: 16.7 G/DL — SIGNIFICANT CHANGE UP (ref 13–17)
MCHC RBC-ENTMCNC: 30.9 PG — SIGNIFICANT CHANGE UP (ref 27–34)
MCHC RBC-ENTMCNC: 33.2 GM/DL — SIGNIFICANT CHANGE UP (ref 32–36)
MCV RBC AUTO: 93.1 FL — SIGNIFICANT CHANGE UP (ref 80–100)
PLATELET # BLD AUTO: 251 K/UL — SIGNIFICANT CHANGE UP (ref 150–400)
RBC # BLD: 5.4 M/UL — SIGNIFICANT CHANGE UP (ref 4.2–5.8)
RBC # FLD: 13.1 % — SIGNIFICANT CHANGE UP (ref 10.3–14.5)
SPECIMEN SOURCE: SIGNIFICANT CHANGE UP
SPECIMEN SOURCE: SIGNIFICANT CHANGE UP
WBC # BLD: 14.19 K/UL — HIGH (ref 3.8–10.5)
WBC # FLD AUTO: 14.19 K/UL — HIGH (ref 3.8–10.5)

## 2017-05-28 PROCEDURE — 80053 COMPREHEN METABOLIC PANEL: CPT

## 2017-05-28 PROCEDURE — 99285 EMERGENCY DEPT VISIT HI MDM: CPT | Mod: 25

## 2017-05-28 PROCEDURE — 99232 SBSQ HOSP IP/OBS MODERATE 35: CPT

## 2017-05-28 PROCEDURE — 85027 COMPLETE CBC AUTOMATED: CPT

## 2017-05-28 PROCEDURE — 87529 HSV DNA AMP PROBE: CPT

## 2017-05-28 PROCEDURE — 87070 CULTURE OTHR SPECIMN AEROBIC: CPT

## 2017-05-28 PROCEDURE — 85730 THROMBOPLASTIN TIME PARTIAL: CPT

## 2017-05-28 PROCEDURE — 87040 BLOOD CULTURE FOR BACTERIA: CPT

## 2017-05-28 PROCEDURE — 85610 PROTHROMBIN TIME: CPT

## 2017-05-28 PROCEDURE — 70450 CT HEAD/BRAIN W/O DYE: CPT

## 2017-05-28 PROCEDURE — 96374 THER/PROPH/DIAG INJ IV PUSH: CPT

## 2017-05-28 PROCEDURE — 87798 DETECT AGENT NOS DNA AMP: CPT

## 2017-05-28 RX ORDER — CETIRIZINE HYDROCHLORIDE 10 MG/1
1 TABLET ORAL
Qty: 30 | Refills: 0 | OUTPATIENT
Start: 2017-05-28 | End: 2017-06-27

## 2017-05-28 RX ORDER — CETIRIZINE HYDROCHLORIDE 10 MG/1
10 TABLET ORAL DAILY
Qty: 0 | Refills: 0 | Status: DISCONTINUED | OUTPATIENT
Start: 2017-05-28 | End: 2017-05-28

## 2017-05-28 RX ORDER — MUPIROCIN 20 MG/G
1 OINTMENT TOPICAL
Qty: 1 | Refills: 0 | OUTPATIENT
Start: 2017-05-28 | End: 2017-06-27

## 2017-05-28 RX ORDER — DIPHENHYDRAMINE HCL 50 MG
1 CAPSULE ORAL
Qty: 0 | Refills: 0 | COMMUNITY
Start: 2017-05-28

## 2017-05-28 RX ADMIN — HEPARIN SODIUM 5000 UNIT(S): 5000 INJECTION INTRAVENOUS; SUBCUTANEOUS at 05:17

## 2017-05-28 RX ADMIN — MUPIROCIN 1 APPLICATION(S): 20 OINTMENT TOPICAL at 17:28

## 2017-05-28 RX ADMIN — Medication 166.67 MILLIGRAM(S): at 05:17

## 2017-05-28 RX ADMIN — PIPERACILLIN AND TAZOBACTAM 25 GRAM(S): 4; .5 INJECTION, POWDER, LYOPHILIZED, FOR SOLUTION INTRAVENOUS at 05:18

## 2017-05-28 RX ADMIN — Medication 30 MILLIGRAM(S): at 15:31

## 2017-05-28 RX ADMIN — Medication 25 MILLIGRAM(S): at 07:35

## 2017-05-28 RX ADMIN — Medication 1 APPLICATION(S): at 07:33

## 2017-05-28 RX ADMIN — CETIRIZINE HYDROCHLORIDE 10 MILLIGRAM(S): 10 TABLET ORAL at 14:30

## 2017-05-28 RX ADMIN — Medication 25 MILLIGRAM(S): at 01:40

## 2017-05-28 RX ADMIN — PIPERACILLIN AND TAZOBACTAM 25 GRAM(S): 4; .5 INJECTION, POWDER, LYOPHILIZED, FOR SOLUTION INTRAVENOUS at 14:30

## 2017-05-28 RX ADMIN — MUPIROCIN 1 APPLICATION(S): 20 OINTMENT TOPICAL at 05:18

## 2017-05-29 LAB
BASOPHILS # BLD AUTO: 0.03 K/UL — SIGNIFICANT CHANGE UP (ref 0–0.2)
BASOPHILS NFR BLD AUTO: 0.2 % — SIGNIFICANT CHANGE UP (ref 0–2)
EOSINOPHIL # BLD AUTO: 0.3 K/UL — SIGNIFICANT CHANGE UP (ref 0–0.5)
EOSINOPHIL NFR BLD AUTO: 1.8 % — SIGNIFICANT CHANGE UP (ref 0–6)
IMM GRANULOCYTES NFR BLD AUTO: 1.5 % — SIGNIFICANT CHANGE UP (ref 0–1.5)
LYMPHOCYTES # BLD AUTO: 1.31 K/UL — SIGNIFICANT CHANGE UP (ref 1–3.3)
LYMPHOCYTES # BLD AUTO: 7.9 % — LOW (ref 13–44)
MANUAL SMEAR VERIFICATION: SIGNIFICANT CHANGE UP
MONOCYTES # BLD AUTO: 1.19 K/UL — HIGH (ref 0–0.9)
MONOCYTES NFR BLD AUTO: 7.2 % — SIGNIFICANT CHANGE UP (ref 2–14)
NEUTROPHILS # BLD AUTO: 13.45 K/UL — HIGH (ref 1.8–7.4)
NEUTROPHILS NFR BLD AUTO: 81.4 % — HIGH (ref 43–77)
NRBC # BLD: 0 /100 WBCS — SIGNIFICANT CHANGE UP (ref 0–0)
PLAT MORPH BLD: NORMAL — SIGNIFICANT CHANGE UP
RBC BLD AUTO: NORMAL — SIGNIFICANT CHANGE UP

## 2017-06-01 ENCOUNTER — APPOINTMENT (OUTPATIENT)
Dept: DERMATOLOGY | Facility: CLINIC | Age: 42
End: 2017-06-01

## 2017-06-01 ENCOUNTER — RESULT REVIEW (OUTPATIENT)
Age: 42
End: 2017-06-01

## 2017-06-01 ENCOUNTER — LABORATORY RESULT (OUTPATIENT)
Age: 42
End: 2017-06-01

## 2017-06-01 VITALS
DIASTOLIC BLOOD PRESSURE: 80 MMHG | WEIGHT: 180 LBS | HEIGHT: 72 IN | BODY MASS INDEX: 24.38 KG/M2 | SYSTOLIC BLOOD PRESSURE: 126 MMHG

## 2017-06-01 DIAGNOSIS — L30.9 DERMATITIS, UNSPECIFIED: ICD-10-CM

## 2017-06-01 DIAGNOSIS — Z91.89 OTHER SPECIFIED PERSONAL RISK FACTORS, NOT ELSEWHERE CLASSIFIED: ICD-10-CM

## 2017-06-01 DIAGNOSIS — Z78.9 OTHER SPECIFIED HEALTH STATUS: ICD-10-CM

## 2017-06-01 LAB
CULTURE RESULTS: SIGNIFICANT CHANGE UP
CULTURE RESULTS: SIGNIFICANT CHANGE UP
SPECIMEN SOURCE: SIGNIFICANT CHANGE UP
SPECIMEN SOURCE: SIGNIFICANT CHANGE UP

## 2017-06-01 RX ORDER — METHYLPREDNISOLONE 4 MG/1
4 TABLET ORAL
Qty: 21 | Refills: 0 | Status: ACTIVE | COMMUNITY
Start: 2017-01-01

## 2017-06-01 RX ORDER — AZITHROMYCIN 250 MG/1
250 TABLET, FILM COATED ORAL
Qty: 6 | Refills: 0 | Status: ACTIVE | COMMUNITY
Start: 2017-01-01

## 2017-06-01 RX ORDER — VALACYCLOVIR 1 G/1
1 TABLET, FILM COATED ORAL
Qty: 30 | Refills: 0 | Status: ACTIVE | COMMUNITY
Start: 2017-05-22

## 2017-06-01 RX ORDER — CEPHALEXIN 500 MG/1
500 CAPSULE ORAL
Qty: 28 | Refills: 0 | Status: ACTIVE | COMMUNITY
Start: 2017-05-22

## 2017-06-20 ENCOUNTER — APPOINTMENT (OUTPATIENT)
Dept: DERMATOLOGY | Facility: CLINIC | Age: 42
End: 2017-06-20
Payer: COMMERCIAL

## 2017-06-20 VITALS — DIASTOLIC BLOOD PRESSURE: 70 MMHG | SYSTOLIC BLOOD PRESSURE: 132 MMHG

## 2017-06-20 DIAGNOSIS — L71.9 ROSACEA, UNSPECIFIED: ICD-10-CM

## 2017-06-20 PROCEDURE — 99213 OFFICE O/P EST LOW 20 MIN: CPT

## 2017-06-20 RX ORDER — METRONIDAZOLE 7.5 MG/G
0.75 GEL TOPICAL TWICE DAILY
Qty: 1 | Refills: 1 | Status: ACTIVE | COMMUNITY
Start: 2017-06-20 | End: 1900-01-01

## 2017-06-20 RX ORDER — DOXYCYCLINE HYCLATE 50 MG/1
50 CAPSULE ORAL
Qty: 1 | Refills: 2 | Status: ACTIVE | COMMUNITY
Start: 2017-06-20 | End: 1900-01-01

## 2018-07-28 PROBLEM — Z78.9 ALCOHOL USE: Status: ACTIVE | Noted: 2017-06-01

## 2024-12-25 PROBLEM — F10.90 ALCOHOL USE: Status: ACTIVE | Noted: 2017-06-01

## 2025-03-14 NOTE — PATIENT PROFILE ADULT. - NS PRO OT REFERRAL QUES 2 YN
PHYSICAL / OCCUPATIONAL THERAPY - DAILY TREATMENT NOTE    Patient Name: Alice Bhatt    Date: 3/14/2025    : 1961  Insurance: Payor: MELVIN MORELAND / Plan: [employer] VA no